# Patient Record
Sex: MALE
[De-identification: names, ages, dates, MRNs, and addresses within clinical notes are randomized per-mention and may not be internally consistent; named-entity substitution may affect disease eponyms.]

---

## 2022-09-20 ENCOUNTER — HOSPITAL ENCOUNTER (INPATIENT)
Dept: HOSPITAL 47 - 3MHU | Age: 47
LOS: 2 days | Discharge: HOME | DRG: 918 | End: 2022-09-22
Attending: PSYCHIATRY & NEUROLOGY | Admitting: PSYCHIATRY & NEUROLOGY
Payer: COMMERCIAL

## 2022-09-20 VITALS — HEART RATE: 71 BPM

## 2022-09-20 DIAGNOSIS — Z56.0: ICD-10-CM

## 2022-09-20 DIAGNOSIS — F10.10: ICD-10-CM

## 2022-09-20 DIAGNOSIS — Z79.899: ICD-10-CM

## 2022-09-20 DIAGNOSIS — F41.9: ICD-10-CM

## 2022-09-20 DIAGNOSIS — F32.A: ICD-10-CM

## 2022-09-20 DIAGNOSIS — F17.210: ICD-10-CM

## 2022-09-20 DIAGNOSIS — F43.25: ICD-10-CM

## 2022-09-20 DIAGNOSIS — G47.00: ICD-10-CM

## 2022-09-20 DIAGNOSIS — F14.11: ICD-10-CM

## 2022-09-20 DIAGNOSIS — Z88.1: ICD-10-CM

## 2022-09-20 DIAGNOSIS — T43.212A: Primary | ICD-10-CM

## 2022-09-20 DIAGNOSIS — Z63.0: ICD-10-CM

## 2022-09-20 DIAGNOSIS — Z81.8: ICD-10-CM

## 2022-09-20 PROCEDURE — 83036 HEMOGLOBIN GLYCOSYLATED A1C: CPT

## 2022-09-20 PROCEDURE — 84443 ASSAY THYROID STIM HORMONE: CPT

## 2022-09-20 PROCEDURE — 80061 LIPID PANEL: CPT

## 2022-09-20 SDOH — ECONOMIC STABILITY - INCOME SECURITY: UNEMPLOYMENT, UNSPECIFIED: Z56.0

## 2022-09-20 SDOH — SOCIAL STABILITY - SOCIAL INSECURITY: PROBLEMS IN RELATIONSHIP WITH SPOUSE OR PARTNER: Z63.0

## 2022-09-21 LAB
CHOLEST SERPL-MCNC: 183 MG/DL (ref 0–200)
HDLC SERPL-MCNC: 50.9 MG/DL (ref 40–60)
LDLC SERPL CALC-MCNC: 101.9 MG/DL (ref 0–131)
TRIGL SERPL-MCNC: 151 MG/DL (ref 0–149)
VLDLC SERPL CALC-MCNC: 30.2 MG/DL (ref 5–40)

## 2022-09-21 RX ADMIN — MIRTAZAPINE SCH MG: 15 TABLET, FILM COATED ORAL at 20:28

## 2022-09-21 RX ADMIN — MIRTAZAPINE SCH MG: 15 TABLET, FILM COATED ORAL at 00:06

## 2022-09-21 RX ADMIN — NICOTINE SCH: 14 PATCH, EXTENDED RELEASE TRANSDERMAL at 09:11

## 2022-09-21 NOTE — P.HP
Psychiatric H&P





- .


H&P Date: 09/21/22


History & Physical: 


                                    Allergies











Allergy/AdvReac Type Severity Reaction Status Date / Time


 


sulfamethoxazole Allergy  Unknown Verified 09/20/22 18:12








                                   Vital Signs











Temp  97.2 F L  09/21/22 05:19


 


Pulse  71   09/21/22 05:19


 


Resp  18   09/21/22 05:19


 


BP  159/76   09/21/22 05:19


 


Pulse Ox  98   09/21/22 05:19


 


FiO2      








                                 Intake & Output











 09/20/22 09/21/22 09/21/22





 18:59 06:59 18:59


 


Weight 100 kg 77.3 kg 








                             Laboratory Last Values











TSH  3.250 mIU/L (0.465-4.680)   09/21/22  09:07    











09/21/22 12:20


IDENTIFYING DATA: Patient is a , unemployed, 47-year-old  male 

with no significant psychiatric history presents to the hospital from [facility] under petition and certification for suicide attempt by overdose.





HPI: Patient presented to the hospital on 09/21/2022, brought into the hospital 

from [facility] after being petitioned and certified for a suicide attempt 

by overdosing on trazodone.  Patient reports that he overdosed on 09/11/2022 on 

trazodone impulsively.  He reports that he has been having ongoing marital 

problems with his wife and has been feeling elevated anxiety and stress from 

this.  He reports that he is trying to work things out however he does not feel 

like she wants to continue the but the relationship.  Further exacerbating his 

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  He vehemently denies that this suicide attempt was 

premeditated but rather occurred in the context of alcohol use along with a 

verbal altercation with his wife's cousin.  This was shortly followed by a 

verbal altercation with his wife that Sunday.  He then overdosed on trazodone 

but vehemently denies that this was an attempt to take his life but rather 

trying to get him to relax and go to sleep.  He reports no prior attempts at 

suicide.  In regards to depressive symptoms, the patient is not reporting any 

significant symptoms of depression at this time.  He denies any sleep changes, 

weight changes, appetite changes, anhedonia, hopelessness, or helplessness.  He 

is currently denying any suicidal or homicidal ideation, intention, and/or plan.


The patient is denying any significant history of bipolar disorder.  He denies 

any grandiosity, increased goal-directed activity, or pressured speech.  He 

reports no history of psychosis.  He denies any auditory or visual 

hallucinations.  He denies any paranoia or other delusions.


The patient was started on Remeron while admitted at [facility] and has been

taking the medication for the past 5 days with no significant side effects.  He 

reports that the medication appears to be helping him with his sleep and his 

appetite.  He has chosen to sign in voluntarily on to the psychiatric unit.





PAST PSYCHIATRIC HISTORY: Patient states that he has been previously diagnosed 

with anxiety.  He reports previous trials of Cymbalta, Lexapro, and most 

recently trazodone.  Patient denies any previous psychiatric hospitalizations. 

Patient denies any psychiatric outpatient follow-up. Patient denies any history 

of suicide attempts in the past.





PMH: Patient denies any significant medical history.





ALLERGIES: Sulfamethoxazole





CHEMICAL DEPENDENCY HISTORY: Patient reports smoking one pack per day of 

tobacco.  He does report drinking 2-5 alcoholic beverages every other day.  He 

reports occasional marijuana use.  He does report a history of illicit drug use 

when he was in his 20s as he is experimented with cocaine and other psychoactive

substances back then.





FAMILY PSYCHIATRIC/SUBSTANCE USE HISTORY: He reports his sister has attempted 

suicide by overdose in the distant past.  He otherwise reports no other mental 

illness in the family.





SOCIAL HISTORY: Patient was born and raised in [place], [place].  He 

is currently  to his wife and this is his second marriage.  They have a 

3-year-old son together.  He has his bachelor's degree in engineering.  He was 

furious he working as a  for a medical device company prior to 

being laid off 10 months ago.  He reports that he is a nonpracticing W[employer].





MENTAL STATUS EXAM: 


General Appearance: Patient appears to be stated age is alert, directable, and 

attempts to cooperate. Patient appears to have good hygiene and grooming.


Behavior: Patient is seated without any agitated behavior.  Eye contact is 

appropriate.


Speech: Patient's speech is fluent and nonpressured.


Mood/Affect: Patient reports their mood is "doing okay," affect is congruent and

euthymic


Suicidality/Homicidality:  Patient denies having any homicidal ideation intent 

or plan. Denies any suicidal ideations intent or plan  


Perceptions: Patient denies any visual hallucinations and denies any auditory 

hallucinations


Though content/process: There is no evidence of any delusional thought content 

and thought process is linear and goal-directed.  Patient is future oriented.


Memory and concentration: AOX3, grossly intact for the purposes of this session.

Can spell "WORLD" backwards


Judgment and insight: Fair





STRENGTHS/WEAKNESSES: Strength is that the patient has good insight and 

judgment.  He is future and goal oriented.  He has a duty to his son.  Weakness 

includes ongoing marital problems and heavy alcohol use.





INTELLECT: average





IMPRESSIONS: 


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


Alcohol use disorder





PLAN: 


-Patient is admitted under voluntary status to MHU for stabilization of 

psychiatric symptoms and safety. Patient signed adult voluntary form and 

medication consent and is placed in patient's chart. 


-Medications :


Remeron 15 mg by mouth at bedtime for depression/insomnia


-Zyprexa and Vistaril PRN for agitation/aggression


-Patient was counselled on substance abuse and desired to cut back on use


-Patient was informed of the risks, benefits and side effects of the medication 

and patient verbally consented to taking the medications. Patient signed med 

consent form and was placed in chart.


-Internal Medicine consult to perform medical evaluation and physical.


-NRT - nicotine patch


-SW on board for discharge planning. Encourage patient to participate in groups 

to work on coping skills.

## 2022-09-22 VITALS — SYSTOLIC BLOOD PRESSURE: 112 MMHG | TEMPERATURE: 98.2 F | DIASTOLIC BLOOD PRESSURE: 69 MMHG | RESPIRATION RATE: 16 BRPM

## 2022-09-22 RX ADMIN — NICOTINE SCH: 14 PATCH, EXTENDED RELEASE TRANSDERMAL at 09:01

## 2022-09-22 NOTE — P.MDCNMH
History of Present Illness


H&P Date: 09/21/22


Chief Complaint: Suicidal thoughts





47-year-old male with no significant past medical history





Patient was brought into the hospital for evaluation due to suicidal ideation 

and attempt by overdosing on medications


Patient denies any mental health problems from the past.  He admits to going 

through a hard time socially





He denies any medical concerns at this time denies any fevers chills nausea 

vomiting chest pain or abdominal pain





He admits to tobacco smoking daily denies any illicit drugs admits to occasional

alcohol





Review of Systems











Pertinent positives as noted in HPI. All other systems were reviewed and are 

negative








Past Medical History


Past Medical History: No Reported History


History of Any Multi-Drug Resistant Organisms: None Reported


Past Surgical History: No Surgical Hx Reported


Past Anesthesia/Blood Transfusion Reactions: No Reported Reaction


Past Psychological History: Depression


Smoking Status: Current every day smoker





- Past Family History


  ** Family


Family Medical History: Coronary Artery Disease (CAD), Diabetes Mellitus





Medications and Allergies


                                Home Medications











 Medication  Instructions  Recorded  Confirmed  Type


 


traZODone HCL [Desyrel] 100 mg PO HS 09/20/22 09/20/22 History








                                    Allergies











Allergy/AdvReac Type Severity Reaction Status Date / Time


 


sulfamethoxazole Allergy  Unknown Verified 09/20/22 18:12














Physical Exam





Constitutional:          No acute distress, conversant, pleasant


Eyes:      Anicteric sclerae, moist conjunctiva, 


         Pupils equal round reactive to light





ENMT:      NC/AT


         Oropharynx clear, no erythema, or exudates





Neck:      Supple, FROM, no masses, or JVD


         No carotid bruits


         No thyromegaly





Lungs:      Clear to auscultation


         Clear to percussion


         Normal respiratory effort, no accessory muscle use 





Cardiovascular:      Heart regular in rate and rhythm, 


         No murmurs, gallops, or rubs


         No peripheral edema





Abdominal:       Soft


         Nontender, no guarding, rebound or rigidity


         Abdomen moving with respiration


         Normoactive bowel sounds


         No hepatomegaly, No splenomegaly


         No palpable mass 


         No abdominal wall hernia noted 





Skin:      Normal temperature, tone, texture, turgor


         No induration


         No subcutaneous nodules 


         No rash, lesions


         No ulcers





Extremities:      No digital cyanosis 


         No clubbing


         Pedal pulses intact and symmetrical


         Radial pulses intact and symmetrical 


         No calf tenderness 





Psychiatric:      Alert and oriented to person, place and time


         Appropriate affect


         fair judgement   


      


Neuro      Muscles Strength 5/5 in all 4 extremities 


         Sensation to light touch grossly present throughout


         Cranial nerves II-XII grossly intact


         No focal sensory deficits


Lymphatics:       no palpable cervical or supraclavicular , or inguinal lymph 

nodes  





Cranial Nerve Examination





- Cranial Nerves


Cranial Nerve II- Optic: Intact


Cranial Nerve III- Oculomotor: Intact


Cranial Nerve IV- Trochlear: Intact


Cranial Nerve V- Trigeminal: Intact


Cranial Nerve VI- Abducens: Intact


Cranial Nerve VII- Facial: Intact


Cranial Nerve VIII- Auditory: Intact


Cranial Nerve IX- Glossopharyngeal: Intact


Cranial Nerve X- Vagus: Intact


Cranial Nerve XI- Accessory: Intact


Cranial Nerve XII- Hypoglossal: Intact





Results


Labs: 


                  Abnormal Lab Results - Last 24 Hours (Table)











  09/21/22 Range/Units





  09:07 


 


Triglycerides  151.00 H  (0..00)  mg/dL














Assessment and Plan


Assessment: 





Suicidal ideation and attempt by overdosing


Management per psych





Tobacco smoking


Patient counseled to quit smoking





Follow-up labs





Thank you for allowing us to participate in the care of this patient.  We will 

follow peripherally.  Do not hesitate to contact us with questions.  Someone can

 be reached from the [facility] hospitalist group at all hours of the day 

at [phone].

## 2022-09-22 NOTE — P.DS
Providers


Date of admission: 


09/20/22 22:30





Expected date of discharge: 09/22/22


Attending physician: 


Yvon Vazquez MD





Consults: 





                                        





09/20/22 18:14


Consult Physician Routine 


   Consulting Provider: [facility]


   Consult Reason/Comments: H&P and medical


   Do you want consulting provider notified?: Yes











Primary care physician: 


Stated None








- Discharge Diagnosis(es)


(1) Adjustment disorder with mixed disturbance of emotions and conduct


Current Visit: Yes   Status: Acute   Priority: High   





(2) Alcohol use disorder


Current Visit: Yes   Status: Chronic   Priority: Medium   


Hospital Course: 





Admission HPI:


Patient is a , unemployed, 47-year-old  male with no significant

psychiatric history presents to the hospital from [facility] under petition 

and certification for suicide attempt by overdose.





Patient presented to the hospital on 09/21/2022, brought into the hospital from 

[facility] after being petitioned and certified for a suicide attempt by 

overdosing on trazodone.  Patient reports that he overdosed on 09/11/2022 on 

trazodone impulsively.  He reports that he has been having ongoing marital 

problems with his wife and has been feeling elevated anxiety and stress from 

this.  He reports that he is trying to work things out however he does not feel 

like she wants to continue the but the relationship.  Further exacerbating his s

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  He vehemently denies that this suicide attempt was 

premeditated but rather occurred in the context of alcohol use along with a 

verbal altercation with his wife's cousin.  This was shortly followed by a 

verbal altercation with his wife that Sunday.  He then overdosed on trazodone 

but vehemently denies that this was an attempt to take his life but rather 

trying to get him to relax and go to sleep.  He reports no prior attempts at 

suicide.  In regards to depressive symptoms, the patient is not reporting any 

significant symptoms of depression at this time.  He denies any sleep changes, 

weight changes, appetite changes, anhedonia, hopelessness, or helplessness.  He 

is currently denying any suicidal or homicidal ideation, intention, and/or plan.


The patient is denying any significant history of bipolar disorder.  He denies 

any grandiosity, increased goal-directed activity, or pressured speech.  He 

reports no history of psychosis.  He denies any auditory or visual 

hallucinations.  He denies any paranoia or other delusions.


The patient was started on Remeron while admitted at [facility] and has been

taking the medication for the past 5 days with no significant side effects.  He 

reports that the medication appears to be helping him with his sleep and his 

appetite.  He has chosen to sign in voluntarily on to the psychiatric unit.





Patient states that he has been previously diagnosed with anxiety.  He reports 

previous trials of Cymbalta, Lexapro, and most recently trazodone.  Patient 

denies any previous psychiatric hospitalizations. Patient denies any psychiatric

outpatient follow-up. Patient denies any history of suicide attempts in the 

past.





Hospital course:


Upon admission to the unit patient was initially presenting as bright and 

cooperative.  Patient was started on Remeron for depression and insomnia.  The 

patient was also seen by medical team for history and physical examination.  

During the hospitalization, the patient displayed no significant symptoms of 

depression and was calm and cooperative with staff and peers.  He attended 

groups with a high-level of participation.  On the day of discharge, the patient

is not reporting any suicidal or homicidal ideation, intention, and/or plan.  He

is not reporting any auditory or visual hallucinations.  He denies any paranoia 

or other delusions.  He reports no access to firearms or other weapons.  The 

patient does have a significant history of heavy alcohol use however was 

counseled great length on abstaining from all substances including alcohol and 

marijuana.  The patient was offered however declined inpatient substance abuse 

rehabilitation.  The patient is future and goal oriented and expresses a strong 

duty to his family and a strong desire to live for himself.  As the patient 

longer met criteria for continued inpatient psychiatric hospitalization, he was 

subsequently discharged.





Mental status exam:


General Appearance: Patient appears to be stated age is alert, pleasant, and 

cooperative. Patient is in no acute distress and has fair hygiene and grooming 


Behavior: Patient is calmly seated without any agitated behavior.


Speech: Patient's speech is fluent and nonpressured. 


Mood/Affect: Patient reports their mood is "feeling good", affect is congruent 

and euthymic to bright. 


Suicidality/Homicidality:  Patient denies having any suicidal or homicidal 

ideation intent or plan.  


Perceptions: Patient denies any auditory or visual hallucinations.  


Though content/process: There is no evidence of any delusional thought content 

and thought process is linear and goal-directed.  Patient is future and goal 

oriented.


Memory and concentration: AOX3, grossly intact for the purposes of this session.

Can spell "WORLD" backwards correctly.


Judgment and insight: Improved with guarded prognosis





Impression:


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


Alcohol use disorder





Plan:


-Continue with discharge today as patient has improved and stabilized 

psychiatrically and is not currently an imminent threat to himself and/or 

others.  Patient will remain at chronically elevated risk due to age 

demographic, male gender, previous attempts at suicide, and alcohol use.  He has

numerous protective factors including a supportive family, duty to his children,

high-level functioning, and future orientation.


-Continue medications: 


Remeron 15 mg by mouth at bedtime for depression/insomnia


-Patient was counseled on the need for medication compliance and appropriate 

follow-up at mental health and also primary care for medical issues.  Patient 

verbalized understanding and agreed.


-Social work to arrange for and conduct family meeting to ensure safety upon 

discharge and answer any questions/concerns. Social work also to arrange for 

patients follow up appointments for psychiatric care along with follow up with 

primary care provider.


-Patient counseled on abstaining from recreational drugs and marijuana and 

alcohol. Was informed/educated on the adverse effects on their physical and 

mental health. Patient verbally agreed and understood. Patient was offered 

substance abuse treatment however declined at this time.


-Patient was instructed to return to the hospital or seek immediate medical care

if their psychiatric or medical symptoms do worsen or reoccur.


-Psychoeducation and supportive therapy provided to patient.  Risks and benefits

of pharmacological treatment versus the risks and benefits of nontreatment 

weight and discussed.  Informed consent discussion held.  Common side effects of

psychotropics discussed such as, but not limited to headache, GI disturbance, 

sexual dysfunction, movement disorders, sedation, and orthostatic hypotension.  

Life threatening and blackbox warnings of prescribed medications also discussed.

 Potential risks of operating a vehicle or heavy machinery discussed with 

patient at length.  Advised on importance of compliance and a reliable and re

sponsible manner. Patient advised to review FDA consumer labeling of all 

medications prior to taking.  Patient verbalized understanding of potential 

risks, and agrees with current treatment plan.  Patient advised to medically 

contact physician/emergency personnel if any acute changes in condition occur.








                                   Vital Signs











Temp  98.2 F   09/22/22 06:53


 


Pulse  71   09/22/22 06:53


 


Resp  16   09/22/22 06:53


 


BP  112/69   09/22/22 06:53


 


Pulse Ox  98   09/21/22 05:19


 


FiO2      











                               Laboratory Results











Estimated Ave Glu mg/dL  93   09/21/22  09:07    


 


Hemoglobin A1c  4.9 % (0.0-6.0)   09/21/22  09:07    


 


Triglycerides  151.00 mg/dL (0..00)  H  09/21/22  09:07    


 


Cholesterol  183.00 mg/dL (0..00)   09/21/22  09:07    


 


LDL Cholesterol, Calc  101.9 mg/dL (0.0-131.0)   09/21/22  09:07    


 


VLDL Cholesterol, Calc  30.20 mg/dL (5.00-40.00)   09/21/22  09:07    


 


HDL Cholesterol  50.90 mg/dL (40.00-60.00)   09/21/22  09:07    


 


Cholesterol/HDL Ratio  3.60 Ratio  09/21/22  09:07    


 


TSH  3.250 mIU/L (0.465-4.680)   09/21/22  09:07    











                                    Allergies











Allergy/AdvReac Type Severity Reaction Status Date / Time


 


sulfamethoxazole Allergy  Unknown Verified 09/20/22 18:12











Patient Condition at Discharge: Stable





Plan - Discharge Summary


Discharge Rx Participant: No


New Discharge Prescriptions: 


New


   Mirtazapine [Remeron] 15 mg PO HS 30 Days  tab





Discontinued


   traZODone HCL [Desyrel] 100 mg PO HS


Discharge Medication List





Mirtazapine [Remeron] 15 mg PO HS 30 Days  tab 09/22/22 [Rx]








Follow up Appointment(s)/Referral(s): 


Bernardo Spencer [Other] - 1 Week


Discharge Disposition: HOME SELF-CARE